# Patient Record
Sex: FEMALE | Race: WHITE
[De-identification: names, ages, dates, MRNs, and addresses within clinical notes are randomized per-mention and may not be internally consistent; named-entity substitution may affect disease eponyms.]

---

## 2018-04-23 ENCOUNTER — HOSPITAL ENCOUNTER (OUTPATIENT)
Dept: HOSPITAL 56 - MW.SDS | Age: 18
Discharge: HOME | End: 2018-04-23
Attending: ORTHOPAEDIC SURGERY
Payer: COMMERCIAL

## 2018-04-23 DIAGNOSIS — M94.262: ICD-10-CM

## 2018-04-23 DIAGNOSIS — Z91.013: ICD-10-CM

## 2018-04-23 DIAGNOSIS — M79.4: Primary | ICD-10-CM

## 2018-04-23 PROCEDURE — 29875 ARTHRS KNEE SURG SYNVCT LMTD: CPT

## 2018-04-23 PROCEDURE — 81025 URINE PREGNANCY TEST: CPT

## 2018-04-23 NOTE — PCM.OPNOTE
- General Post-Op/Procedure Note


Date of Surgery/Procedure: 04/23/18


Operative Procedure(s): Left knee arthroscopy with limited synovectomy


Post-Op Diagnosis: L knee fat pad impingement


Anesthesia Technique: General LMA


Primary Surgeon: Nanda Harley


Assistant: Domonique Donohue in mLs: 5


Condition: Good


Free Text/Narrative:: 





tt=14 min


#[id number]

## 2018-04-23 NOTE — PCM.PREANE
Preanesthetic Assessment





- Anesthesia/Transfusion/Family Hx


Anesthesia History: Prior Anesthesia Without Reaction


Family History of Anesthesia Reaction: No


Transfusion History: No Prior Transfusion(s)





- Review of Systems


General: No Symptoms


Pulmonary: No Symptoms


Cardiovascular: No Symptoms


Gastrointestinal: No Symptoms


Neurological: No Symptoms


Other: Reports: None





- Physical Assessment


NPO Status Date: 04/22/18


O2 Sat by Pulse Oximetry: 96


Respiratory Rate: 16


Vital Signs: 





 Last Vital Signs











Temp  36.2 C   04/23/18 06:46


 


Pulse  74   04/23/18 06:46


 


Resp  16   04/23/18 06:46


 


BP  116/68   04/23/18 06:46


 


Pulse Ox  96   04/23/18 06:46











Height: 1.65 m


Weight: 67.132 kg


ASA Class: 1


Mental Status: Alert & Oriented x3


Airway Class: Mallampati = 2


Dentition: Reports: Normal Dentition


ROM/Head Extension: Full


Lungs: Clear to Auscultation, Normal Respiratory Effort


Cardiovascular: Regular Rate, Regular Rhythm





- Lab


Values: 





 Laboratory Last Values











Urine HCG, Qual  NEGATIVE  (NEGATIVE)   04/23/18  06:25    














- Allergies


Allergies/Adverse Reactions: 


 Allergies











Allergy/AdvReac Type Severity Reaction Status Date / Time


 


shellfish derived Allergy  Anaphylactic Verified 04/19/18 09:07





   Shock  














- Anesthesia Plan


Pre-Op Medication Ordered: None





- Acknowledgements


Anesthesia Type Planned: General Anesthesia


Pt an Appropriate Candidate for the Planned Anesthesia: Yes


Alternatives and Risks of Anesthesia Discussed w Pt/Guardian: Yes


Pt/Guardian Understands and Agrees with Anesthesia Plan: Yes


Additional Comments: 





PLAN:  GA-LMA





PreAnesthesia Questionnaire


HEENT History: Reports: Other (See Below)


Other HEENT History: wears glasses/contacts


Musculoskeletal History: Reports: Fracture





- Past Surgical History


Head Surgeries/Procedures: Reports: None


Musculoskeletal Surgical History: Reports: Arthroscopic Knee


Other Musculoskeletal Surgeries/Procedures:: hx surgery to left hand for fx, hx 

left knee arthroscopy





- SUBSTANCE USE


Smoking Status *Q: Never Smoker


Recreational Drug Use History: No





- HOME MEDS


Home Medications: 


 Home Meds





. [No Known Home Meds]  04/19/18 [History]











- CURRENT (IN HOUSE) MEDS


Current Meds: 





 Current Medications





Hydrocodone Bitart/Acetaminophen (Norco 325-5 Mg)  1 - 2 tab PO Q4H PRN


   PRN Reason: Pain


Cefazolin Sodium/Dextrose 1 gm (/ Premix)  50 mls @ 100 mls/hr IV ONCALL [facility]


Lactated Ringer's (Ringers, Lactated)  1,000 mls @ 100 mls/hr IV ASDIRECTED [facility]


   Last Admin: 04/23/18 06:52 Dose:  100 mls/hr





Discontinued Medications





Cefazolin Sodium (Ancef) Confirm Administered Dose 1 gm .ROUTE .STK-MED ONE


   Stop: 04/23/18 07:10


Fentanyl (Sublimaze) Confirm Administered Dose 250 mcg .ROUTE .STK-MED ONE


   Stop: 04/23/18 07:07


Sodium Chloride (Normal Saline) Confirm Administered Dose 20 mls @ as directed 

.ROUTE .STK-MED ONE


   Stop: 04/23/18 07:10


Lidocaine (Xylocaine-Mpf 2%) Confirm Administered Dose 5 ml .ROUTE .STK-MED ONE


   Stop: 04/23/18 07:07


Midazolam HCl (Versed 1 Mg/Ml) Confirm Administered Dose 2 mg .ROUTE .STK-MED 

ONE


   Stop: 04/23/18 07:07


Ondansetron HCl (Zofran) Confirm Administered Dose 4 mg .ROUTE .STK-MED ONE


   Stop: 04/23/18 07:07


Propofol (Diprivan  20 Ml) Confirm Administered Dose 200 mg .ROUTE .STK-MED ONE


   Stop: 04/23/18 07:07

## 2021-12-08 ENCOUNTER — HOSPITAL ENCOUNTER (EMERGENCY)
Dept: HOSPITAL 41 - JD.ED | Age: 21
Discharge: HOME | End: 2021-12-08
Payer: COMMERCIAL
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
## 2021-12-08 DIAGNOSIS — Z91.013: ICD-10-CM
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
## 2021-12-08 DIAGNOSIS — Z79.899: ICD-10-CM

## 2021-12-08 DIAGNOSIS — S61.307A: Primary | ICD-10-CM

## 2021-12-08 NOTE — EDM.PDOC
ED HPI GENERAL MEDICAL PROBLEM





- General


Chief Complaint: Upper Extremity Injury/Pain


Stated Complaint: FINGER INJURY


Time Seen by Provider: 12/08/21 20:19


Source of Information: Reports: Patient, RN Notes Reviewed


History Limitations: Reports: No Limitations





- History of Present Illness


INITIAL COMMENTS - FREE TEXT/NARRATIVE: 





Patient is a 21-year-old female presenting to the emergency department with 

complaints of fingernail injury to her left finger.  Reports she was playing 

basketball and her acrylic fingernail got bent backwards.  It is still attached 

at the nailbed but is otherwise detached.  They trimmed the nail down prior to 

coming to ER.


  ** Left Finger-Little


Pain Score (Numeric/FACES): 6





- Related Data


                                    Allergies











Allergy/AdvReac Type Severity Reaction Status Date / Time


 


shellfish derived Allergy  Anaphylactic Verified 12/08/21 20:23





   Shock  











Home Meds: 


                                    Home Meds





Celecoxib [CeleBREX] 200 mg PO DAILY 12/08/21 [History]


Levonorgestrel/Ethin.estradiol [Aviane-28 Tablet] 1 tab PO DAILY 12/08/21 

[History]











Past Medical History


HEENT History: Reports: Other (See Below)


Other HEENT History: wears glasses/contacts


Musculoskeletal History: Reports: Fracture





- Past Surgical History


Head Surgeries/Procedures: Reports: None


Musculoskeletal Surgical History: Reports: Arthroscopic Knee


Other Musculoskeletal Surgeries/Procedures:: hx surgery to left hand for fx, hx 

left knee arthroscopy





Social & Family History





- Tobacco Use


Tobacco Use Status *Q: Never Tobacco User





- Caffeine Use


Caffeine Use: Reports: Coffee





- Recreational Drug Use


Recreational Drug Use: No





Review of Systems





- Review of Systems


Review Of Systems: Comprehensive ROS is negative, except as noted in HPI.





ED EXAM, GENERAL





- Physical Exam


Exam: See Below


Exam Limited By: No Limitations


General Appearance: Alert, WD/WN, No Apparent Distress


Respiratory/Chest: No Respiratory Distress, Lungs Clear, Normal Breath Sounds, 

No Accessory Muscle Use, Chest Non-Tender


Cardiovascular: Normal Peripheral Pulses, Regular Rate, Rhythm, No Edema, No 

Gallop, No JVD, No Murmur, No Rub


Extremities: Other (Loose fingernail on the left fifth finger.  Nail is still 

attached at the nailbed.  No active bleeding.)





Course





- Vital Signs


Last Recorded V/S: 


                                Last Vital Signs











Temp  98.1 F   12/08/21 20:18


 


Pulse  93   12/08/21 20:18


 


Resp  20   12/08/21 20:18


 


BP  132/85   12/08/21 20:18


 


Pulse Ox  98   12/08/21 20:18














- Re-Assessments/Exams


Free Text/Narrative Re-Assessment/Exam: 


Patient is a 21-year-old female presenting to the emergency department with 

complaints of injury to her fingernail on her left fifth finger.  She has 

acrylics on and reports that she has not had a chance to trim them down.  She 

was playing basketball and the ball hit the nail, bending it backwards.  It is 

still connected at the nailbed.  Fingernail was further trimmed so that it is 

flush with the fingertip.  Discussed with patient that we will leave the 

fingernail in place to protect the nailbed and not further damage the nail 

matrix.  She should keep it covered to prevent nail from getting snagged.  

Soaking it in soapy warm water twice daily and then keep it covered.  As the 

nail grows out she may begin to trim it.  Discussed return precautions.  

Discharge instructions as documented.








Departure





- Departure


Time of Disposition: 21:21


Disposition: Home, Self-Care 01


Condition: Good


Clinical Impression: 


Fingernail avulsion, partial


Qualifiers:


 Encounter type: initial encounter Qualified Code(s): S61.309A - Unspecified 

open wound of unspecified finger with damage to nail, initial encounter








- Discharge Information


*PRESCRIPTION DRUG MONITORING PROGRAM REVIEWED*: No


*COPY OF PRESCRIPTION DRUG MONITORING REPORT IN PATIENT HIEU: No


Instructions:  Nail Avulsion


Referrals: 


PCP,Not In Area [Primary Care Provider] - 


Forms:  ED Department Discharge


Additional Instructions: 


Keep the fingernail covered to avoid it becoming snagged.





Soak finger nail in warm soap and water twice daily to keep it clean.





As the nail begins to grow out, you may trim it.





Do not pull the nail off.





Use Tylenol and ibuprofen as needed for discomfort.





Return to ER as needed.





Sepsis Event Note (ED)





- Evaluation


Sepsis Screening Result: No Definite Risk





- Focused Exam


Vital Signs: 


                                   Vital Signs











  Temp Pulse Resp BP Pulse Ox


 


 12/08/21 20:18  98.1 F  93  20  132/85  98